# Patient Record
Sex: FEMALE | Race: WHITE | NOT HISPANIC OR LATINO | Employment: OTHER | URBAN - METROPOLITAN AREA
[De-identification: names, ages, dates, MRNs, and addresses within clinical notes are randomized per-mention and may not be internally consistent; named-entity substitution may affect disease eponyms.]

---

## 2018-02-23 ENCOUNTER — EST. PATIENT EMERGENCY (OUTPATIENT)
Dept: URBAN - METROPOLITAN AREA CLINIC 43 | Facility: CLINIC | Age: 58
End: 2018-02-23

## 2018-02-23 DIAGNOSIS — H04.122: ICD-10-CM

## 2018-02-23 PROCEDURE — 92012 INTRM OPH EXAM EST PATIENT: CPT

## 2018-02-23 PROCEDURE — G8785 BP SCRN NO PERF AT INTERVAL: HCPCS

## 2018-02-23 PROCEDURE — 1036F TOBACCO NON-USER: CPT

## 2018-02-23 PROCEDURE — G8427 DOCREV CUR MEDS BY ELIG CLIN: HCPCS

## 2018-02-23 ASSESSMENT — VISUAL ACUITY
OS_SC: J12
OD_SC: 20/70
OS_SC: 20/60
OS_CC: 20/20
OS_CC: J1
OD_CC: J1
OD_SC: J12
OD_CC: 20/20-1

## 2018-02-23 ASSESSMENT — TONOMETRY
OD_IOP_MMHG: 15
OS_IOP_MMHG: 14

## 2020-08-26 NOTE — PATIENT DISCUSSION
[place] grid at home. MVI/AREDS discussed. Patient to call if any changes in vision or grid card. PO Xanax 0.25mg ordered

## 2022-08-03 ENCOUNTER — NEW PATIENT (OUTPATIENT)
Dept: URBAN - METROPOLITAN AREA CLINIC 43 | Facility: CLINIC | Age: 62
End: 2022-08-03

## 2022-08-03 DIAGNOSIS — H53.413: ICD-10-CM

## 2022-08-03 DIAGNOSIS — H25.813: ICD-10-CM

## 2022-08-03 DIAGNOSIS — H04.122: ICD-10-CM

## 2022-08-03 PROCEDURE — 92004 COMPRE OPH EXAM NEW PT 1/>: CPT

## 2022-08-03 PROCEDURE — 92015 DETERMINE REFRACTIVE STATE: CPT

## 2022-08-03 PROCEDURE — 92134 CPTRZ OPH DX IMG PST SGM RTA: CPT

## 2022-08-03 ASSESSMENT — VISUAL ACUITY
OD_SC: 20/80
OS_SC: J12
OD_SC: <J12
OS_SC: 20/80
OD_CC: 20/30+2
OS_CC: 20/25-1
OS_CC: J1
OD_CC: J1

## 2022-08-03 ASSESSMENT — TONOMETRY
OS_IOP_MMHG: 14
OD_IOP_MMHG: 12

## 2022-09-20 ENCOUNTER — FOLLOW UP (OUTPATIENT)
Dept: URBAN - METROPOLITAN AREA CLINIC 43 | Facility: CLINIC | Age: 62
End: 2022-09-20

## 2022-09-20 DIAGNOSIS — H25.813: ICD-10-CM

## 2022-09-20 DIAGNOSIS — H53.413: ICD-10-CM

## 2022-09-20 DIAGNOSIS — H04.122: ICD-10-CM

## 2022-09-20 PROCEDURE — 92015GRNC REFRACTION GLASSES RECHECK - NO CHARGE

## 2022-09-20 ASSESSMENT — VISUAL ACUITY
OD_CC: 20/40
OD_CC: J3
OS_CC: J3
OS_CC: 20/30+1

## 2023-08-03 ENCOUNTER — PREPPED CHART (OUTPATIENT)
Dept: URBAN - METROPOLITAN AREA CLINIC 43 | Facility: CLINIC | Age: 63
End: 2023-08-03